# Patient Record
Sex: FEMALE | Race: WHITE | NOT HISPANIC OR LATINO | Employment: OTHER | URBAN - METROPOLITAN AREA
[De-identification: names, ages, dates, MRNs, and addresses within clinical notes are randomized per-mention and may not be internally consistent; named-entity substitution may affect disease eponyms.]

---

## 2017-05-17 ENCOUNTER — CONVERSION ENCOUNTER (OUTPATIENT)
Dept: RADIOLOGY | Facility: IMAGING CENTER | Age: 82
End: 2017-05-17

## 2018-08-15 ENCOUNTER — OFFICE VISIT (OUTPATIENT)
Dept: URGENT CARE | Age: 83
End: 2018-08-15
Payer: MEDICARE

## 2018-08-15 VITALS — WEIGHT: 113 LBS | BODY MASS INDEX: 22.19 KG/M2 | HEIGHT: 60 IN

## 2018-08-15 DIAGNOSIS — S81.812A SKIN TEAR OF LEFT LOWER LEG WITHOUT COMPLICATION, INITIAL ENCOUNTER: Primary | ICD-10-CM

## 2018-08-15 PROCEDURE — G0463 HOSPITAL OUTPT CLINIC VISIT: HCPCS | Performed by: PHYSICIAN ASSISTANT

## 2018-08-15 PROCEDURE — 99203 OFFICE O/P NEW LOW 30 MIN: CPT | Performed by: PHYSICIAN ASSISTANT

## 2018-08-15 RX ORDER — RIBOFLAVIN (VITAMIN B2) 100 MG
100 TABLET ORAL DAILY
COMMUNITY

## 2018-08-15 RX ORDER — POTASSIUM BICARBONATE 25 MEQ/1
10 TABLET, EFFERVESCENT ORAL 2 TIMES DAILY
COMMUNITY

## 2018-08-15 RX ORDER — AMLODIPINE BESYLATE AND ATORVASTATIN CALCIUM 2.5; 1 MG/1; MG/1
1 TABLET, FILM COATED ORAL DAILY
COMMUNITY

## 2018-08-16 NOTE — PROGRESS NOTES
[facility]        NAME: Tracey Jones is a 80 y o  female  : 1924    MRN: [id number]  DATE: August 15, 2018  TIME: 8:16 PM    Assessment and Plan   Skin tear of left lower leg without complication, initial encounter [[id number] [id number]]  1  Skin tear of left lower leg without complication, initial encounter           Patient Instructions     Change dressing tomorrow  Follow up with PCP in 3-5 days  Proceed to  ER if symptoms worsen  Chief Complaint     Chief Complaint   Patient presents with    Laceration     patient recieved a small v shaped skin tear, unaware of how it happened  small amount bleeding and clear drainage noted from skin tear, no pain noted          History of Present Illness       80year-old female presents with the left lower leg wound  Patient does remember injuring the leg but is bleeding  No pain to the area  Able to ambulate  No numbness or tingling  Injury   The incident occurred less than 1 hour ago  The incident occurred at home  The injury mechanism is unknown  The context of the injury is unknown  No protective equipment was used  There is an injury to the left lower leg  She is experiencing no pain  It is unlikely that a foreign body is present  Pertinent negatives include no light-headedness, numbness or vomiting  There have been no prior injuries to these areas  Her tetanus status is UTD  Review of Systems   Review of Systems   Constitutional: Negative  HENT: Negative  Respiratory: Negative  Cardiovascular: Negative  Gastrointestinal: Negative for vomiting  Musculoskeletal: Negative  Skin: Positive for wound  Neurological: Negative for light-headedness and numbness           Current Medications       Current Outpatient Prescriptions:     amLODIPine-atorvastatin (CADUET) 2 5-10 MG per tablet, Take 1 tablet by mouth daily, Disp: , Rfl:     Ascorbic Acid (VITAMIN C) 100 MG tablet, Take 100 mg by mouth daily, Disp: , Rfl:     potassium bicarbonate (K-LYTE) 25 MEQ disintegrating tablet, Take 10 mEq by mouth 2 (two) times a day, Disp: , Rfl:     Current Allergies     Allergies as of 08/15/2018 - never reviewed   Allergen Reaction Noted    Amoxicillin  08/15/2018    Augmentin [amoxicillin-pot clavulanate]  08/15/2018    Fosamax [alendronate]  08/15/2018    Levofloxacin  08/15/2018    Metronidazole  08/15/2018    Nexium [esomeprazole]  08/15/2018    Prednisone  08/15/2018    Prevacid [lansoprazole]  08/15/2018    Raloxifene  08/15/2018            The following portions of the patient's history were reviewed and updated as appropriate: allergies, current medications, past family history, past medical history, past social history, past surgical history and problem list      No past medical history on file  No past surgical history on file  No family history on file  Medications have been verified  Objective   Ht 5' (1 524 m)   Wt 51 3 kg (113 lb)   BMI 22 07 kg/m²        Physical Exam     Physical Exam   Constitutional: She is oriented to person, place, and time  She appears well-developed and well-nourished  No distress  HENT:   Head: Normocephalic and atraumatic  Right Ear: External ear normal    Left Ear: External ear normal    Nose: Nose normal    Mouth/Throat: Oropharynx is clear and moist  No oropharyngeal exudate  Eyes: Conjunctivae are normal  Right eye exhibits no discharge  Left eye exhibits no discharge  Neck: Normal range of motion  Neck supple  Cardiovascular: Normal rate, regular rhythm and intact distal pulses  Pulmonary/Chest: Effort normal  No respiratory distress  Abdominal: Soft  Bowel sounds are normal    Musculoskeletal: Normal range of motion  Left lower leg: She exhibits laceration (Skin tear)  She exhibits no tenderness, no bony tenderness, no swelling, no edema and no deformity  Legs:  Lymphadenopathy:     She has no cervical adenopathy     Neurological: She is alert and oriented to person, place, and time  Skin: Skin is warm and dry  Psychiatric: She has a normal mood and affect  Nursing note and vitals reviewed  Wound cleaned with dermal  and surgical foam was placed over top of wound and held in place by large Band-Aid

## 2018-08-16 NOTE — PATIENT INSTRUCTIONS
Change dressing tomorrow  Follow up with PCP in 3-5 days  Proceed to  ER if symptoms worsen  Skin Tear   WHAT YOU NEED TO KNOW:   A skin tear occurs when the layers of weakened skin split open from an injury  , elderly, and chronically or critically ill people are at higher risk for skin tears  Long-term use of steroids can also increase the risk  It is important to treat and prevent skin tears to prevent infection  DISCHARGE INSTRUCTIONS:   Medicines:   · Medicines  may be given to reduce your pain or to treat or prevent an infection  Do not wait until the pain is severe before you ask for more medicine  · Take your medicine as directed  Contact your healthcare provider if you think your medicine is not helping or if you have side effects  Tell him of her if you are allergic to any medicine  Keep a list of the medicines, vitamins, and herbs you take  Include the amounts, and when and why you take them  Bring the list or the pill bottles to follow-up visits  Carry your medicine list with you in case of an emergency  Follow up with your healthcare provider as directed:  Write down your questions so you remember to ask them during your visits  Wound care: You may be referred to a wound specialist who will teach you how to clean your wound properly  Ask for more information about wound care  Prevent another skin tear:   · Clean, moisturize, and protect your skin  Baths, hot showers, and soap can dry your skin and increase your risk for skin tears  Take tepid showers, use mild soap as directed, and gently pat your skin dry  Use lotion to keep your skin moist after you shower  Wear long sleeves, pants, and protective footwear  · Move carefully  Ask for help if you cannot lift yourself well enough to avoid dragging your skin when you move  · Keep your home safe  Cover sharp corners, keep your pathways clear, and turn on lights so you can see clearly   Ask for more information if you have questions about home safety  · Drink liquids as directed  Ask your healthcare provider how much liquid to drink each day and which liquids are best for you  Liquids will help keep your skin moist and protected from future skin tears  · Eat high-protein foods  Examples are lean meats, fish, low-fat dairy products, and beans  A dietitian may help you create high-protein meal plans to help with wound healing  Contact your healthcare provider if:   · You have redness, swelling, pus, or a bad odor coming from your wound  · Blood soaks through your bandage  · You have increased pain, even after you take pain medicine  · Your wound tears open again  Return to the emergency department if:   · You have a fever  © 2017 [employer]  Information is for End User's use only and may not be sold, redistributed or otherwise used for commercial purposes  All illustrations and images included in CareNotes® are the copyrighted property of [employer]  or [employer]  The above information is an  only  It is not intended as medical advice for individual conditions or treatments  Talk to your doctor, nurse or pharmacist before following any medical regimen to see if it is safe and effective for you